# Patient Record
Sex: FEMALE | Race: WHITE
[De-identification: names, ages, dates, MRNs, and addresses within clinical notes are randomized per-mention and may not be internally consistent; named-entity substitution may affect disease eponyms.]

---

## 2018-01-16 ENCOUNTER — HOSPITAL ENCOUNTER (EMERGENCY)
Dept: HOSPITAL 25 - UCCORT | Age: 23
Discharge: HOME | End: 2018-01-16
Payer: SELF-PAY

## 2018-01-16 VITALS — SYSTOLIC BLOOD PRESSURE: 114 MMHG | DIASTOLIC BLOOD PRESSURE: 70 MMHG

## 2018-01-16 DIAGNOSIS — S39.012A: Primary | ICD-10-CM
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
## 2018-01-16 DIAGNOSIS — Z90.49: ICD-10-CM
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
## 2018-01-16 DIAGNOSIS — S63.614A: ICD-10-CM
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
## 2018-01-16 DIAGNOSIS — E66.9: ICD-10-CM

## 2018-01-16 PROCEDURE — 73140 X-RAY EXAM OF FINGER(S): CPT

## 2018-01-16 PROCEDURE — 99202 OFFICE O/P NEW SF 15 MIN: CPT

## 2018-01-16 PROCEDURE — G0463 HOSPITAL OUTPT CLINIC VISIT: HCPCS
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
